# Patient Record
Sex: FEMALE | Race: WHITE
[De-identification: names, ages, dates, MRNs, and addresses within clinical notes are randomized per-mention and may not be internally consistent; named-entity substitution may affect disease eponyms.]

---

## 2018-03-11 ENCOUNTER — HOSPITAL ENCOUNTER (EMERGENCY)
Dept: HOSPITAL 24 - ER | Age: 33
Discharge: HOME | End: 2018-03-11
Payer: SELF-PAY

## 2018-03-11 VITALS — BODY MASS INDEX: 29.6 KG/M2

## 2018-03-11 VITALS — SYSTOLIC BLOOD PRESSURE: 140 MMHG | DIASTOLIC BLOOD PRESSURE: 85 MMHG
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
## 2018-03-11 DIAGNOSIS — S93.401A: Primary | ICD-10-CM

## 2018-03-11 DIAGNOSIS — S83.91XA: ICD-10-CM

## 2018-03-11 PROCEDURE — 73564 X-RAY EXAM KNEE 4 OR MORE: CPT

## 2018-03-11 PROCEDURE — 99282 EMERGENCY DEPT VISIT SF MDM: CPT

## 2018-03-11 PROCEDURE — 73630 X-RAY EXAM OF FOOT: CPT
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
